# Patient Record
Sex: FEMALE | Race: ASIAN | NOT HISPANIC OR LATINO
[De-identification: names, ages, dates, MRNs, and addresses within clinical notes are randomized per-mention and may not be internally consistent; named-entity substitution may affect disease eponyms.]

---

## 2020-06-16 DIAGNOSIS — Z01.818 ENCOUNTER FOR OTHER PREPROCEDURAL EXAMINATION: ICD-10-CM

## 2020-06-17 ENCOUNTER — APPOINTMENT (OUTPATIENT)
Dept: DISASTER EMERGENCY | Facility: CLINIC | Age: 60
End: 2020-06-17

## 2020-06-18 LAB — SARS-COV-2 N GENE NPH QL NAA+PROBE: NOT DETECTED

## 2022-04-14 ENCOUNTER — APPOINTMENT (OUTPATIENT)
Dept: PAIN MANAGEMENT | Facility: CLINIC | Age: 62
End: 2022-04-14
Payer: COMMERCIAL

## 2022-04-14 VITALS — HEIGHT: 64 IN | WEIGHT: 180 LBS | BODY MASS INDEX: 30.73 KG/M2

## 2022-04-14 PROCEDURE — 99214 OFFICE O/P EST MOD 30 MIN: CPT

## 2022-04-14 NOTE — DATA REVIEWED
[MRI] : MRI [Lumbar Spine] : lumbar spine [Report was reviewed and noted in the chart] : The report was reviewed and noted in the chart [I reviewed the films/CD] : I reviewed the films/CD

## 2022-04-15 NOTE — PHYSICAL EXAM
[Normal Coordination] : normal coordination [Normal Mood and Affect] : normal mood and affect [Orientated] : orientated [Able to Communicate] : able to communicate [Well Developed] : well developed [Well Nourished] : well nourished [Left lower extremity below knee] : left lower extremity below knee [] : non-antalgic [FreeTextEntry3] : well healed midline and left iliac surgical scar

## 2022-04-15 NOTE — ASSESSMENT
[FreeTextEntry1] : A thorough discussion occurred regarding available pain management treatment options including interventional, rehabilitative, pharmacological, and alternative modalities with the patient. We will proceed with the following:\par \par Interventional treatment options:\par - Proceed with left L4-5, L5-S1 TFESI with fluoroscopic guidance; may remain on aspirin\par - Previously discussed concern of corticosteroid administration in setting for osteoporosis; patient is monitored yearly by her endocrinologist\par - see additional instructions below\par \par Rehabilitative options:\par - Has completed recent PT trial with relief; encouraged on going HEP\par - Home exercise sheet provided at prior visit\par \par Medication based treatment options:\par - Continue Tylenol 500-1000mg TID PRN\par - continue gabapentin 2400mg daily as per treating neurologist \par - previous chronic opioid use several years ago; was able to self discontinue \par - see additional instructions below\par \par Complementary treatment options:\par - Weight management and lifestyle modifications discussed\par \par Additional treatment recommendations as follows:\par - Follow up 1-2 weeks post injection for assessment of efficacy and further recommendations.\par \par The risks, benefits and alternatives of the proposed procedure were explained in detail with the patient. The risks outlined include but are not limited to infection, bleeding, post- dural puncture headache, nerve injury, a temporary increase in pain, failure to resolve symptoms, allergic reaction, and possible elevation of blood sugar in diabetics if using corticosteroid.  All questions were answered to patient's apparent satisfaction and he/she verbalized an understanding.\par \par The documentation recorded by the scribe, in my presence, accurately reflects the service I personally performed and the decisions made by me with my edits as appropriate.\par \par I, Bessy Young acting as scribe, attest that this documentation has been prepared under the direction and in the presence of Provider Wilman Lopez DO. JAN

## 2022-04-15 NOTE — REVIEW OF SYSTEMS
[Headache] : headache [Depression] : depression [Negative] : Heme/Lymph [Fever] : no fever [Chills] : no chills [Feeling Tired] : no fatigue

## 2022-04-15 NOTE — HISTORY OF PRESENT ILLNESS
[Lower back] : lower back [Buttock] : buttock [Left Leg] : left leg [9] : 9 [4] : 4 [Burning] : burning [Constant] : constant [Sleep] : sleep [Rest] : rest [Injection therapy] : injection therapy [Sitting] : sitting [Physical therapy] : physical therapy [Retired] : Work status: retired [2] : 2 [] : no [FreeTextEntry1] : l [FreeTextEntry7] : left leg  [de-identified] : 2003 [de-identified] : 2021 [de-identified] : mri l spine  [TWNoteComboBox1] : 100%

## 2022-05-02 LAB — SARS-COV-2 N GENE NPH QL NAA+PROBE: NOT DETECTED

## 2022-05-06 ENCOUNTER — APPOINTMENT (OUTPATIENT)
Dept: PAIN MANAGEMENT | Facility: CLINIC | Age: 62
End: 2022-05-06
Payer: COMMERCIAL

## 2022-05-06 PROCEDURE — 64484 NJX AA&/STRD TFRM EPI L/S EA: CPT | Mod: LT

## 2022-05-06 PROCEDURE — 64483 NJX AA&/STRD TFRM EPI L/S 1: CPT | Mod: LT

## 2022-05-06 NOTE — PROCEDURE
[FreeTextEntry3] : Date of Service: 05/06/2022 \par \par Account: [id number]\par \par Patient: PIPER BREWER \par \par YOB: 1960\par \par Age: 62 year\par \par Surgeon:   Wilman Lopez DO\par \par Assistant:  None\par \par Pre-Operative Diagnosis:   Lumbosacral Radiculitis (M54.17)\par \par Post Operative Diagnosis: Lumbosacral Radiculitis (M54.17)\par \par Procedure:             Left L4-L5, L5-S1 transforaminal epidural steroid injection under fluoroscopic guidance.\par \par Anesthesia:            MAC\par \par This procedure was carried out using fluoroscopic guidance.  The risks and benefits of the procedure were discussed extensively with the patient.  The consent of the patient was obtained and the following procedure was performed. The patient was placed in the prone position on the fluoroscopy table and the area was prepped and draped in a sterile fashion.  A timeout was performed with all essential staff present and the site and side were verified.\par \par The left L4-L5 neural foramen was then identified on right oblique "juli dog" anatomical view at the 6 o' clock position using fluoroscopic guidance, and the area was marked. The overlying skin and subcutaneous structures were anesthetized using sterile technique with 1% Lidocaine.   A 22 gauge 3.5 inch spinal needle was directed toward the inferior (6 o'clock) position of the pedicle, which formed the roof of the identified foramen.  Once in the epidural space, after negative aspiration for heme and CSF, 1cc of Omnipaque contrast was injected to confirm epidural location and assess filling defects and rule out intravascular needle placement.\par \par Lumbar epidurogram showed no intravascular or intrathecal flow pattern.  No blood or CSF was aspirated.  Omnipaque spread medially in epidural space and outlined the exiting nerve root.\par \par After this, 2.5 cc of a mixture of 4cc of preservative free normal saline plus 80 mg of Kenalog was injected in the epidural space\par \par The left L5-S1 neural foramen and was then identified on right oblique "juli dog" anatomical view at the 6 o' clock position using fluoroscopic guidance, and the area was marked.  The overlying skin and subcutaneous structures were anesthetized using sterile technique with 1% Lidocaine.   A 22 gauge 3.5 inch spinal needle was directed toward the inferior (6 o'clock) position of the pedicle, which formed the roof of the identified foramen.  Once in the epidural space, after negative aspiration for heme and CSF, 1cc of Omnipaque contrast was injected to confirm epidural location and assess filling defects and rule out intravascular needle placement. \par \par Lumbar epidurogram showed no intravascular or intrathecal flow pattern.  No blood or CSF was aspirated.  Omnipaque spread medially in epidural space and outlined the exiting nerve root. \par \par After this, the remainder of the injectate listed above was injected in the epidural space.\par \par The needle was subsequently removed.  Vital signs remained normal.  Pulse oximeter was used throughout the procedure and the patient's pulse and oxygen saturation remained within normal limits.  The patient tolerated the procedure well.  There were no complications.  The patient was instructed to apply ice over the injection sites for twenty minutes every two hours for the next 24 to 48 hours.\par \par Disposition:\par      1. The patient was advised to F/U in 1-2 weeks to assess the response to the injection.\par      2. The patient was also instructed to contact me immediately if there were any concerns related to the procedure performed.

## 2022-06-23 ENCOUNTER — APPOINTMENT (OUTPATIENT)
Dept: PAIN MANAGEMENT | Facility: CLINIC | Age: 62
End: 2022-06-23
Payer: COMMERCIAL

## 2022-06-23 VITALS — HEIGHT: 64 IN | BODY MASS INDEX: 30.73 KG/M2 | WEIGHT: 180 LBS

## 2022-06-23 PROCEDURE — 99214 OFFICE O/P EST MOD 30 MIN: CPT

## 2022-06-25 NOTE — ASSESSMENT
[FreeTextEntry1] : A thorough discussion occurred regarding available pain management treatment options including interventional, rehabilitative, pharmacological, and alternative modalities with the patient. We will proceed with the following:\par \par Interventional treatment options:\par - can repeat caudal RASHEEDA with severe radicular pain; may remain on aspirin \par - patient continues to maintain that caudal RASHEEDA provide her the greatest degree of relief \par - once again I discussed my concern for repeat corticosteroid administration in setting for osteoporosis; patient is monitored yearly by her endocrinologist\par - furthermore I counseled her I do not advise corticosteroid based injections more than 3/x per year at most\par - see additional instructions below\par \par Rehabilitative options:\par - Has completed recent PT trial with relief; encouraged on going HEP\par - Home exercise sheet provided at prior visit\par \par Medication based treatment options:\par - Continue Tylenol 500-1000mg TID PRN\par - continue gabapentin 2400mg daily as per treating neurologist \par - previous chronic opioid use several years ago; was able to self discontinue \par - see additional instructions below\par \par Complementary treatment options:\par - Weight management and lifestyle modifications discussed\par \par Additional treatment recommendations as follows:\par - Follow up for indicated procedure or PRN\par \par The documentation recorded by the scribe, in my presence, accurately reflects the service I personally performed and the decisions made by me with my edits as appropriate.\par \par I, Bessy Young acting as scribe, attest that this documentation has been prepared under the direction and in the presence of Provider Wilman Lopez DO.

## 2022-06-25 NOTE — HISTORY OF PRESENT ILLNESS
[Lower back] : lower back [Buttock] : buttock [Left Leg] : left leg [Burning] : burning [Constant] : constant [Sleep] : sleep [Rest] : rest [Injection therapy] : injection therapy [Sitting] : sitting [Physical therapy] : physical therapy [Retired] : Work status: retired [2] : 2 [6] : 6 [3] : 3 [FreeTextEntry1] : 06/23/22 - Patient presents for a FUV following an left L4-5, L5-S1 TFESI on 05/06/22. She reports resolution of leg pain. She reports burning sensation to the low back with residual paraesthesia to the left toes. \par \par 04/14/22 - Patient presents for a FUV. Patient reports return of low back pain. Patient c/o low back pain radiating to the left lower extremity. Patient reports pain with walking.  Patient reports relief with previous RASHEEDA. \par \par 01/27/2022 - Patient presents for a follow up evaluation and review of her lumbar spine MRI results. Patient reports that her most recent RASHEEDA 1/7/22 was helpful in alleviating her pain. Patient reports that after the steroids wear off, she experiences mild lower back pain with moderate to severe radicular left posterior thigh. Patient reports a >90% reduction in her symptoms following her last RASHEEDA. \par \par 12/02/21 - Patient presents for a follow up evaluation and review of her lumbar MRI Results. Patient c/o radicular leg pains. Patient reports that she has been experiencing increasing radicular left leg pain. Patient has weakness in her left leg. Patient c/o worsening paresthesia's in her left leg. Patient reports that walking exacerbates her pain. \par \par 09/23/21 - Patient presents for a FUV following a Caudal RASHEEDA on 09/03/21. Patient reports a 100% resolution in her leg symptoms. MRI imaging of lumbar spine was not approved prior. She reports that the duration of relief she has obtained over the last few years wit RASHEEDA's has diminished. \par \par 8/26/21 - Patient presents for a follow up evaluation and discussion of her lumbar MRI. The patient could not get approval for her MRI. The patient's main c/o is in her left leg only. \par \par 07/29/2021 - Patient presents for initial evaluation. She reports that she was seeing  for pain management following a compression fracture she developed on July, 30th 2003. The patient had a spinal fusion following her compression fracture and was found to have osteoporosis. The patient reports they did a fusion of T12-l2. The patient reports her pain is in her lower back. The patient reports her lower back radiates into her left buttock and left leg when walking. She has obtain caudal RASHEEDA's in the past for her pain. The patient reports she has been receiving RASHEEDA's every 3 months since 2005. The patient reports that she doesn't take any pain medications. The patient used to be on chronic opioids but has since discontinued use of them. Sees neurologist for chronic back pains. The patient takes gabapentin 1200mg x2 daily. \par \par Previous Injections:\par 1) Caudal RASHEEDA's - Dr. Doran (several DOS)\par 2) Caudal RASHEEDA (09/03/21, 1/7/22)\par 3) Left L4-5, L5-S1 TFESI (05/06/22) \par \par Pertinent Surgical History: Yes\par 1) T12-L2 posterior spinal fusion; L1 Kyphoplasty\par \par Imaging:\par Lumbar Spine MRI (01/13/2022) - [facility]\par \par 1. Evidence For Prior T12-L2 Fusion\par 2. Evidence for a chronic moderate anterior wedging compression fracture of L1 with prior kyphoplasty augmentation\par 3. Minimal disc bulging at L4-5 with mild bilateral foraminal stenosis and mild bilateral facet arthropathy \par \par Physician Disclaimer: I have personally reviewed and confirmed all HPI data with the patient. [] : no [FreeTextEntry7] : left leg  [de-identified] : 2003 [de-identified] : 2021 [de-identified] : mri l spine  [TWNoteComboBox1] : 100%

## 2022-06-25 NOTE — PHYSICAL EXAM
[Normal Coordination] : normal coordination [Normal Mood and Affect] : normal mood and affect [Orientated] : orientated [Able to Communicate] : able to communicate [Well Developed] : well developed [Well Nourished] : well nourished [Left lower extremity below knee] : left lower extremity below knee [Left] : left hip [] : non-antalgic [FreeTextEntry3] : well healed midline and left iliac surgical scar

## 2022-06-25 NOTE — REASON FOR VISIT
[Follow-Up Visit] : a follow-up pain management visit [FreeTextEntry2] : lower back and follow up to injection

## 2022-09-02 ENCOUNTER — APPOINTMENT (OUTPATIENT)
Dept: PAIN MANAGEMENT | Facility: CLINIC | Age: 62
End: 2022-09-02

## 2022-09-02 PROCEDURE — 62323 NJX INTERLAMINAR LMBR/SAC: CPT

## 2022-09-02 NOTE — PROCEDURE
[FreeTextEntry3] : Date of Service: 09/02/2022 \par \par Account: [id number]\par \par Patient: PIPER BREWER \par \par YOB: 1960\par \par Age: 62 year\par \par Surgeon:      Wilman Lopez DO\par \par Assistant:    None\par \par Pre-Operative Diagnosis:         Lumbar radiculopathy\par \par Post Operative Diagnosis:       Lumbar radiculopathy\par \par Procedure:             Caudal epidural steroid injection with fluoroscopic guidance\par \par Anesthesia:            MAC\par \par This procedure was carried out using fluoroscopic guidance.  The risks and benefits of the procedure were discussed extensively with the patient.  The consent of the patient was obtained and the following procedure was performed. The patient was placed in the prone position on the fluoroscopy table and the area was prepped and draped in a sterile fashion.  A timeout was performed with all essential staff present and the site and side were verified.\par \par The sacrum was visualized using a lateral  fluoroscopic view.   Using sterile technique the superficial skin was anesthetized with 1% Lidocaine.  A 22 gauge 3.5 spinal needle was advanced under fluoroscopy until the sacrococcygeal ligament was engaged.  After confirmation of needle placement in the epidural space, the needle was advanced to approximately the S3 level.  After negative aspiration for heme and CSF, 3 cc of Omnipaque confirmed an appropriate lumbar epidurogram.  An A/P view was used to confirm dye spread.  An injectate of 8cc of preservative free normal saline with 80 mg of Kenalog was then injected into the epidural space.\par \par The needle was subsequently removed.  Vital signs remained normal.  Pulse oximeter was used throughout the procedure and the patient's pulse and oxygen saturation remained within normal limits.  The patient tolerated the procedure well.  There were no complications.  The patient was instructed to apply ice over the injection sites for twenty minutes every two hours for the next 24 to 48 hours.\par \par Disposition:\par      1.  The patient was advised to F/U in 1-2 weeks to assess the response to the injection.\par      2.  The patient was also instructed to contact me immediately if there were any concerns related to the procedure performed.

## 2022-09-29 ENCOUNTER — APPOINTMENT (OUTPATIENT)
Dept: PAIN MANAGEMENT | Facility: CLINIC | Age: 62
End: 2022-09-29

## 2022-09-29 VITALS — WEIGHT: 180 LBS | HEIGHT: 64 IN | BODY MASS INDEX: 30.73 KG/M2

## 2022-09-29 PROCEDURE — 99214 OFFICE O/P EST MOD 30 MIN: CPT

## 2022-09-30 NOTE — HISTORY OF PRESENT ILLNESS
[Lower back] : lower back [Buttock] : buttock [Left Leg] : left leg [6] : 6 [3] : 3 [Burning] : burning [Constant] : constant [Sleep] : sleep [Rest] : rest [Injection therapy] : injection therapy [Sitting] : sitting [Physical therapy] : physical therapy [Retired] : Work status: retired [2] : 2 [FreeTextEntry1] : 09/29/22 - Patient presents for a FUV following a caudal RASHEEDA on 09/02/21.  Reports that her pain has been significantly reduced, reports a reduction of pain by 80%. Reports that she has increased her level of activity, and has decreased her medication usage.  \par \par 06/23/22 - Patient presents for a FUV following an left L4-5, L5-S1 TFESI on 05/06/22. She reports resolution of leg pain. She reports burning sensation to the low back with residual paraesthesia to the left toes. \par \par 04/14/22 - Patient presents for a FUV. Patient reports return of low back pain. Patient c/o low back pain radiating to the left lower extremity. Patient reports pain with walking. Patient reports relief with previous RASHEEDA. \par \par 01/27/2022 - Patient presents for a follow up evaluation and review of her lumbar spine MRI results. Patient reports that her most recent RASHEEDA 1/7/22 was helpful in alleviating her pain. Patient reports that after the steroids wear off, she experiences mild lower back pain with moderate to severe radicular left posterior thigh. Patient reports a >90% reduction in her symptoms following her last RASHEEDA. \par \par 12/02/21 - Patient presents for a follow up evaluation and review of her lumbar MRI Results. Patient c/o radicular leg pains. Patient reports that she has been experiencing increasing radicular left leg pain. Patient has weakness in her left leg. Patient c/o worsening paresthesia's in her left leg. Patient reports that walking exacerbates her pain. \par \par 09/23/21 - Patient presents for a FUV following a Caudal RASHEEDA on 09/03/21. Patient reports a 100% resolution in her leg symptoms. MRI imaging of lumbar spine was not approved prior. She reports that the duration of relief she has obtained over the last few years wit RASHEEDA's has diminished. \par \par 8/26/21 - Patient presents for a follow up evaluation and discussion of her lumbar MRI. The patient could not get approval for her MRI. The patient's main c/o is in her left leg only. \par \par 07/29/2021 - Patient presents for initial evaluation. She reports that she was seeing  for pain management following a compression fracture she developed on July, 30th 2003. The patient had a spinal fusion following her compression fracture and was found to have osteoporosis. The patient reports they did a fusion of T12-l2. The patient reports her pain is in her lower back. The patient reports her lower back radiates into her left buttock and left leg when walking. She has obtain caudal RASHEEDA's in the past for her pain. The patient reports she has been receiving RASHEEDA's every 3 months since 2005. The patient reports that she doesn't take any pain medications. The patient used to be on chronic opioids but has since discontinued use of them. Sees neurologist for chronic back pains. The patient takes gabapentin 1200mg x2 daily. \par \par Previous Injections:\par 1) Caudal RASHEEDA's - Dr. Doran (several DOS)\par 2) Caudal RASHEEDA (09/03/21, 1/7/22, 09/02/22)\par 3) Left L4-5, L5-S1 TFESI (05/06/22) \par \par Pertinent Surgical History: Yes\par 1) T12-L2 posterior spinal fusion; L1 Kyphoplasty\par \par Imaging:\par Lumbar Spine MRI (01/13/2022) - [facility]\par \par 1. Evidence For Prior T12-L2 Fusion\par 2. Evidence for a chronic moderate anterior wedging compression fracture of L1 with prior kyphoplasty augmentation\par 3. Minimal disc bulging at L4-5 with mild bilateral foraminal stenosis and mild bilateral facet arthropathy \par \par Physician Disclaimer: I have personally reviewed and confirmed all HPI data with the patient.  [] : no [FreeTextEntry7] : left leg  [de-identified] : 2003 [de-identified] : 2021 [de-identified] : mri l spine  [TWNoteComboBox1] : 100%

## 2022-09-30 NOTE — ASSESSMENT
[FreeTextEntry1] : A thorough discussion occurred regarding available pain management treatment options including interventional, rehabilitative, pharmacological, and alternative modalities with the patient. We will proceed with the following:\par \par Interventional treatment options:\par - can repeat caudal RASHEEDA with return of severe radicular pain; may remain on aspirin - will call\par - previously discussed my concern for repeat corticosteroid administration in setting for osteoporosis; patient is monitored yearly by her endocrinologist\par - furthermore I counseled her I do not advise corticosteroid based injections more than Q4-6 month intervals per year at most\par - see additional instructions below\par \par Rehabilitative options:\par - Has completed recent PT trial with relief; encouraged on going HEP\par - Home exercise sheet provided at prior visit\par \par Medication based treatment options:\par - Continue Tylenol 500-1000 mg TID PRN\par - continue gabapentin 2400 mg daily as per treating neurologist \par - previous chronic opioid use several years ago; was able to self discontinue \par - see additional instructions below\par \par Complementary treatment options:\par - Weight management and lifestyle modifications discussed\par \par Additional treatment recommendations as follows:\par - Follow up for indicated procedure or PRN\par \par The risks, benefits and alternatives of the proposed procedure were explained in detail with the patient.  The risks outlined include, but are not limited to, infection, bleeding, nerve injury, a temporary increase in pain, failure to resolve symptoms, allergic reaction, and possible elevation of blood sugar in diabetics.  All questions were answered to patient's apparent satisfaction and he/she verbalized an understanding. \par \par The documentation recorded by the scribe, in my presence, accurately reflects the service I personally performed and the decisions made by me with my edits as appropriate.\par \par I, Brad Billings acting as scribe, attest that this documentation has been prepared under the direction and in the presence of Provider Wilman Lopez DO.

## 2022-09-30 NOTE — PHYSICAL EXAM
[Biceps 2+] : biceps 2+ [Triceps 2+] : triceps 2+ [Brachioradialis 2+] : brachioradialis 2+ [de-identified] : Constitutional:  \par - No acute distress  \par - Well developed; well nourished  \par \par Neurological:  \par - normal mood and affect  \par - alert and oriented x 3   \par \par Cardiovascular:  \par - grossly normal  [] : no sciatic nerve tenderness

## 2023-01-06 ENCOUNTER — APPOINTMENT (OUTPATIENT)
Dept: PAIN MANAGEMENT | Facility: CLINIC | Age: 63
End: 2023-01-06
Payer: COMMERCIAL

## 2023-01-06 PROCEDURE — 62323 NJX INTERLAMINAR LMBR/SAC: CPT

## 2023-01-06 NOTE — PROCEDURE
[FreeTextEntry3] : Date of Service: 01/06/2023 \par \par Account: [id number]\par \par Patient: PIPER BREWER \par \par YOB: 1960\par \par Age: 62 year\par \par Surgeon:      Wilman Lopez DO\par \par Assistant:    None\par \par Pre-Operative Diagnosis:         Lumbar radiculopathy\par \par Post Operative Diagnosis:       Lumbar radiculopathy\par \par Procedure:             Caudal epidural steroid injection with fluoroscopic guidance\par \par Anesthesia:            MAC\par \par This procedure was carried out using fluoroscopic guidance.  The risks and benefits of the procedure were discussed extensively with the patient.  The consent of the patient was obtained and the following procedure was performed. The patient was placed in the prone position on the fluoroscopy table and the area was prepped and draped in a sterile fashion.  A timeout was performed with all essential staff present and the site and side were verified.\par \par The sacrum was visualized using a lateral  fluoroscopic view.   Using sterile technique the superficial skin was anesthetized with 1% Lidocaine.  A 22 gauge 3.5 spinal needle was advanced under fluoroscopy until the sacrococcygeal ligament was engaged.  After confirmation of needle placement in the epidural space, the needle was advanced to approximately the S3 level.  After negative aspiration for heme and CSF, 3 cc of Omnipaque confirmed an appropriate lumbar epidurogram.  An A/P view was used to confirm dye spread.  An injectate of 8cc of preservative free normal saline with 80 mg of Kenalog was then injected into the epidural space.\par \par The needle was subsequently removed.  The patient tolerated the procedure well.  There were no complications.  The patient was instructed to apply ice over the injection sites for twenty minutes every two hours for the next 24 to 48 hours.\par \par Disposition:\par      1.  The patient was advised to F/U in 1-2 weeks to assess the response to the injection.\par      2.  The patient was also instructed to contact me immediately if there were any concerns related to the procedure performed.

## 2023-01-26 ENCOUNTER — APPOINTMENT (OUTPATIENT)
Dept: PAIN MANAGEMENT | Facility: CLINIC | Age: 63
End: 2023-01-26
Payer: COMMERCIAL

## 2023-01-26 VITALS — WEIGHT: 180 LBS | BODY MASS INDEX: 30.73 KG/M2 | HEIGHT: 64 IN

## 2023-01-26 PROCEDURE — 99214 OFFICE O/P EST MOD 30 MIN: CPT

## 2023-01-27 NOTE — PHYSICAL EXAM
[de-identified] : Constitutional:  \par - No acute distress  \par - Well developed; well nourished  \par \par Neurological:  \par - normal mood and affect  \par - alert and oriented x 3   \par \par Cardiovascular:  \par - grossly normal \par \par Lumbar Spine Exam: \par \par Inspection: \par erythema (-) \par ecchymosis (-) \par rashes (-) \par alignment: no scoliosis \par \par Palpation: \par Midline lumbar tenderness:            (-) \par midline thoracic tenderness:          (-) \par Lumbar paraspinal tenderness:  L (-) ; R (-) \par thoracic paraspinal tenderness: L (-) ; R (-) \par sciatic nerve tenderness :          L (-) ; R (-) \par SI joint tenderness:                     L (-) ; R (-) \par GTB tenderness:                        L (-);  R (-) \par \par ROM: \par \par Strength: \par                                    Right       Left    \par Hip Flexion:                (5/5)       (5/5) \par Quadriceps:               (5/5)       (5/5) \par Hamstrings:                (5/5)       (5/5) \par Ankle Dorsiflexion:     (5/5)       (5/5) \par EHL:                           (5/5)       (5/5) \par Ankle Plantarflexion:  (5/5)       (5/5) \par \par Special Tests: \par SLR:                            R (-) ; L (-) \par Facet loading:             R (-) ; L (-) \par GISSEL test:                R (-) ; L (-) \par Hamstring tightness:   R (-);  L (-) \par \par Neurologic: \par SILT throughout right lower extremity \par SILT throughout left lower extremity \par \par Reflexes normal and symmetric bilateral lower extremities \par \par Gait: \par non- antalgic gait \par ambulates without assistive device \par \par Cervical Spine Exam: \par \par Inspection:  \par erythema (-)  \par ecchymosis (-)  \par rashes (-)  \par \par Palpation:                                                   \par Cervical paraspinal tenderness:         R (-); L(-) \par Upper trapezius tenderness:              R (-); L (-) \par Rhomboids tenderness:                      R (-); L (-) \par Occipital Ridge:                                    R (-); L (-) \par Supraspinatus tenderness:                 R (-); L (-) \par \par ROM:   \par \par Strength Testing:             \par Deltoid                           R (5/5); L (5/5) \par Biceps:                          R (5/5); L (5/5) \par Triceps:                         R (5/5); L (5/5) \par Finger Abductors:         R (5/5); L (5/5) \par Grasp:                           R (5/5); L (5/5) \par \par Special Testing: \par Spurling Test:                  R (-); L (-) \par Facet load test:               R (-); L (-) \par \par Neuro: \par SILT throughout right upper extremity \par SILT throughout left upper extremity \par \par Reflexes: \par Biceps   -           R (2+); L (2+) \par Triceps  -           R (2+); L (2+) \par Brachioradialis- R (2+); L (2+)   \par \par No ankle clonus

## 2023-01-27 NOTE — HISTORY OF PRESENT ILLNESS
[Lower back] : lower back [Buttock] : buttock [Left Leg] : left leg [6] : 6 [3] : 3 [Burning] : burning [Constant] : constant [Sleep] : sleep [Rest] : rest [Injection therapy] : injection therapy [Sitting] : sitting [Physical therapy] : physical therapy [Retired] : Work status: retired [2] : 2 [FreeTextEntry1] : 1/26/2023 - Patient presents for FUV after a caudal RASHEEDA on 1/6/2023. Patient reports greater than 80% relief from the lower back pain following the procedure. Patient is happy with the results of the injection. She takes 2400mg gabapentin daily; sees neurology.\par \par 09/29/22 - Patient presents for a FUV following a caudal RASHEEDA on 09/02/21.  Reports that her pain has been significantly reduced, reports a reduction of pain by 80%. Reports that she has increased her level of activity, and has decreased her medication usage.  \par \par 06/23/22 - Patient presents for a FUV following an left L4-5, L5-S1 TFESI on 05/06/22. She reports resolution of leg pain. She reports burning sensation to the low back with residual paraesthesia to the left toes. \par \par 04/14/22 - Patient presents for a FUV. Patient reports return of low back pain. Patient c/o low back pain radiating to the left lower extremity. Patient reports pain with walking. Patient reports relief with previous RASHEEDA. \par \par 01/27/2022 - Patient presents for a follow up evaluation and review of her lumbar spine MRI results. Patient reports that her most recent RASHEEDA 1/7/22 was helpful in alleviating her pain. Patient reports that after the steroids wear off, she experiences mild lower back pain with moderate to severe radicular left posterior thigh. Patient reports a >90% reduction in her symptoms following her last RASHEEDA. \par \par 12/02/21 - Patient presents for a follow up evaluation and review of her lumbar MRI Results. Patient c/o radicular leg pains. Patient reports that she has been experiencing increasing radicular left leg pain. Patient has weakness in her left leg. Patient c/o worsening paresthesia's in her left leg. Patient reports that walking exacerbates her pain. \par \par 09/23/21 - Patient presents for a FUV following a Caudal RASHEEDA on 09/03/21. Patient reports a 100% resolution in her leg symptoms. MRI imaging of lumbar spine was not approved prior. She reports that the duration of relief she has obtained over the last few years wit RASHEEDA's has diminished. \par \par 8/26/21 - Patient presents for a follow up evaluation and discussion of her lumbar MRI. The patient could not get approval for her MRI. The patient's main c/o is in her left leg only. \par \par 07/29/2021 - Patient presents for initial evaluation. She reports that she was seeing  for pain management following a compression fracture she developed on July, 30th 2003. The patient had a spinal fusion following her compression fracture and was found to have osteoporosis. The patient reports they did a fusion of T12-l2. The patient reports her pain is in her lower back. The patient reports her lower back radiates into her left buttock and left leg when walking. She has obtain caudal RASHEEDA's in the past for her pain. The patient reports she has been receiving RASHEEDA's every 3 months since 2005. The patient reports that she doesn't take any pain medications. The patient used to be on chronic opioids but has since discontinued use of them. Sees neurologist for chronic back pains. The patient takes gabapentin 1200mg x2 daily. \par \par Previous Injections:\par 1) Caudal RASHEEDA's - Dr. Doran (several DOS)\par 2) Caudal RASHEEDA (09/03/21, 1/7/22, 09/02/22, 1/6/2023)\par 3) Left L4-5, L5-S1 TFESI (05/06/22) \par \par Pertinent Surgical History: Yes\par 1) T12-L2 posterior spinal fusion; L1 Kyphoplasty\par \par Imaging:\par Lumbar Spine MRI (01/13/2022) - [facility]\par \par 1. Evidence For Prior T12-L2 Fusion\par 2. Evidence for a chronic moderate anterior wedging compression fracture of L1 with prior kyphoplasty augmentation\par 3. Minimal disc bulging at L4-5 with mild bilateral foraminal stenosis and mild bilateral facet arthropathy \par \par Physician Disclaimer: I have personally reviewed and confirmed all HPI data with the patient.  [] : no [FreeTextEntry7] : left leg  [de-identified] : 2003 [de-identified] : 2021 [de-identified] : mri l spine  [TWNoteComboBox1] : 100%

## 2023-01-27 NOTE — ASSESSMENT
[FreeTextEntry1] : A thorough discussion occurred regarding available pain management treatment options including interventional, rehabilitative, pharmacological, and alternative modalities with the patient. We will proceed with the following:\par \par Interventional treatment options:\par - can repeat caudal RASHEEDA with return of severe radicular pain; may remain on aspirin - will call\par - previously discussed my concern for repeat corticosteroid administration in setting for osteoporosis; patient is monitored yearly by her endocrinologist\par - furthermore I counseled her I do not advise corticosteroid based injections more than Q4-6 month intervals per year at most\par - see additional instructions below\par \par Rehabilitative options:\par - Has completed recent PT trial with relief; encouraged on going HEP\par - Home exercise sheet provided at prior visit\par \par Medication based treatment options:\par - Continue Tylenol 500-1000 mg TID PRN\par - continue gabapentin 2400 mg daily as per treating neurologist \par - previous chronic opioid use several years ago; was able to self discontinue \par - see additional instructions below\par \par Complementary treatment options:\par - Weight management and lifestyle modifications discussed\par \par Additional treatment recommendations as follows:\par - Follow up for indicated procedure or PRN\par \par The risks, benefits and alternatives of the proposed procedure were explained in detail with the patient.  The risks outlined include, but are not limited to, infection, bleeding, nerve injury, a temporary increase in pain, failure to resolve symptoms, allergic reaction, and possible elevation of blood sugar in diabetics.  All questions were answered to patient's apparent satisfaction and he/she verbalized an understanding. \par \par The documentation recorded by the scribe, in my presence, accurately reflects the service I personally performed and the decisions made by me with my edits as appropriate.\par \par I, Josué Torres acting as scribe, attest that this documentation has been prepared under the direction and in the presence of Provider Wilman Lopez DO.

## 2023-05-12 ENCOUNTER — APPOINTMENT (OUTPATIENT)
Dept: PAIN MANAGEMENT | Facility: CLINIC | Age: 63
End: 2023-05-12
Payer: COMMERCIAL

## 2023-05-12 PROCEDURE — 62323 NJX INTERLAMINAR LMBR/SAC: CPT

## 2023-05-12 NOTE — PROCEDURE
[FreeTextEntry3] : Date of Service: 05/12/2023 \par \par Account: [id number]\par \par Patient: PIPER BREWER \par \par YOB: 1960\par \par Age: 63 year\par \par Surgeon:      Wilman Lopez DO\par \par Assistant:    None\par \par Pre-Operative Diagnosis:         Lumbar radiculopathy\par \par Post Operative Diagnosis:       Lumbar radiculopathy\par \par Procedure:             Caudal epidural steroid injection with fluoroscopic guidance\par \par Anesthesia:            MAC\par \par This procedure was carried out using fluoroscopic guidance.  The risks and benefits of the procedure were discussed extensively with the patient.  The consent of the patient was obtained and the following procedure was performed.  A timeout was performed with all essential staff present and the site and side were verified.\par \par The sacrum was visualized using a lateral  fluoroscopic view.   Using sterile technique the superficial skin was anesthetized with 1% Lidocaine.  A 22 gauge 3.5 spinal needle was advanced under fluoroscopy until the sacrococcygeal ligament was engaged.  After confirmation of needle placement in the epidural space, the needle was advanced to approximately the S3 level.  After negative aspiration for heme and CSF, 3 cc of Omnipaque confirmed an appropriate lumbar epidurogram.  An A/P view was used to confirm dye spread.  An injectate of 8cc of preservative free normal saline with 80 mg of Kenalog was then injected into the epidural space.\par \par Vital signs remained normal throughout the procedure.  The patient tolerated the procedure well.  There were no immediate complications from the performed procedure.  The patient was instructed to apply ice over the injection sites for twenty minutes every two hours for the next 24 hours.\par \par Disposition:\par      1.  The patient was advised to F/U in 1-2 weeks to assess the response to the injection.\par      2.  The patient was also instructed to contact me immediately if there were any concerns related to the procedure performed.

## 2023-06-08 ENCOUNTER — APPOINTMENT (OUTPATIENT)
Dept: PAIN MANAGEMENT | Facility: CLINIC | Age: 63
End: 2023-06-08
Payer: COMMERCIAL

## 2023-06-08 VITALS — BODY MASS INDEX: 30.73 KG/M2 | HEIGHT: 64 IN | WEIGHT: 180 LBS

## 2023-06-08 PROCEDURE — 99214 OFFICE O/P EST MOD 30 MIN: CPT

## 2023-06-08 NOTE — ASSESSMENT
[FreeTextEntry1] : A thorough discussion occurred regarding available pain management treatment options including interventional, rehabilitative, pharmacological, and alternative modalities with the patient. We will proceed with the following:\par \par Interventional treatment options:\par - can repeat caudal RASHEEDA with return of severe radicular pain; may remain on aspirin - will call\par - previously discussed my concern for repeat corticosteroid administration in setting for osteoporosis; patient is monitored yearly by her endocrinologist\par - furthermore I counseled her I do not advise corticosteroid based injections more than Q4-6 month intervals per year at most\par - see additional instructions below\par \par Rehabilitative options:\par - Has completed recent PT trial with relief; encouraged on going HEP\par - Home exercise sheet provided at prior visit\par \par Medication based treatment options:\par - Continue Tylenol 500-1000 mg TID PRN\par - continue gabapentin 2400 mg daily as per treating neurologist \par - previous chronic opioid use several years ago; was able to self discontinue \par - see additional instructions below\par \par Complementary treatment options:\par - Weight management and lifestyle modifications discussed\par \par Additional treatment recommendations as follows:\par - Follow up for indicated procedure or PRN\par \par The risks, benefits and alternatives of the proposed procedure were explained in detail with the patient.  The risks outlined include, but are not limited to, infection, bleeding, nerve injury, a temporary increase in pain, failure to resolve symptoms, allergic reaction, and possible elevation of blood sugar in diabetics.  All questions were answered to patient's apparent satisfaction and he/she verbalized an understanding. \par \par The documentation recorded by the scribe, in my presence, accurately reflects the service I personally performed and the decisions made by me with my edits as appropriate.\par \par I, Ciro ERWIN, personally performed the services described in this documentation incident to Wilman Lopez DO.

## 2023-06-08 NOTE — HISTORY OF PRESENT ILLNESS
[Lower back] : lower back [Buttock] : buttock [Left Leg] : left leg [6] : 6 [3] : 3 [Burning] : burning [Constant] : constant [Sleep] : sleep [Rest] : rest [Injection therapy] : injection therapy [Sitting] : sitting [Physical therapy] : physical therapy [Retired] : Work status: retired [2] : 2 [FreeTextEntry1] : 6/8/2023- Patient presents for FUV after a caudal RASHEEDA on 5/12/2023.  Patient reports 97% relief following the procedure.  Patient is happy with the results of the injection. She takes 2400 mg gabapentin daily; sees neurology.\par \par 1/26/2023 - Patient presents for FUV after a caudal RASHEEDA on 1/6/2023. Patient reports greater than 80% relief from the lower back pain following the procedure. Patient is happy with the results of the injection. She takes 2400mg gabapentin daily; sees neurology.\par \par 09/29/22 - Patient presents for a FUV following a caudal RASHEEDA on 09/02/21.  Reports that her pain has been significantly reduced, reports a reduction of pain by 80%. Reports that she has increased her level of activity, and has decreased her medication usage.  \par \par 06/23/22 - Patient presents for a FUV following an left L4-5, L5-S1 TFESI on 05/06/22. She reports resolution of leg pain. She reports burning sensation to the low back with residual paraesthesia to the left toes. \par \par 04/14/22 - Patient presents for a FUV. Patient reports return of low back pain. Patient c/o low back pain radiating to the left lower extremity. Patient reports pain with walking. Patient reports relief with previous RASHEEDA. \par \par 01/27/2022 - Patient presents for a follow up evaluation and review of her lumbar spine MRI results. Patient reports that her most recent RASHEEDA 1/7/22 was helpful in alleviating her pain. Patient reports that after the steroids wear off, she experiences mild lower back pain with moderate to severe radicular left posterior thigh. Patient reports a >90% reduction in her symptoms following her last RASHEEDA. \par \par 12/02/21 - Patient presents for a follow up evaluation and review of her lumbar MRI Results. Patient c/o radicular leg pains. Patient reports that she has been experiencing increasing radicular left leg pain. Patient has weakness in her left leg. Patient c/o worsening paresthesia's in her left leg. Patient reports that walking exacerbates her pain. \par \par 09/23/21 - Patient presents for a FUV following a Caudal RASHEEDA on 09/03/21. Patient reports a 100% resolution in her leg symptoms. MRI imaging of lumbar spine was not approved prior. She reports that the duration of relief she has obtained over the last few years wit RASHEEDA's has diminished. \par \par 8/26/21 - Patient presents for a follow up evaluation and discussion of her lumbar MRI. The patient could not get approval for her MRI. The patient's main c/o is in her left leg only. \par \par 07/29/2021 - Patient presents for initial evaluation. She reports that she was seeing  for pain management following a compression fracture she developed on July, 30th 2003. The patient had a spinal fusion following her compression fracture and was found to have osteoporosis. The patient reports they did a fusion of T12-l2. The patient reports her pain is in her lower back. The patient reports her lower back radiates into her left buttock and left leg when walking. She has obtain caudal RASHEEDA's in the past for her pain. The patient reports she has been receiving RASHEEDA's every 3 months since 2005. The patient reports that she doesn't take any pain medications. The patient used to be on chronic opioids but has since discontinued use of them. Sees neurologist for chronic back pains. The patient takes gabapentin 1200mg x2 daily. \par \par Previous Injections:\par 1) Caudal RASHEEDA's - Dr. Doran (several DOS)\par 2) Caudal RASHEEDA (09/03/21, 1/7/22, 09/02/22, 1/6/2023, 5/12/2023)\par 3) Left L4-5, L5-S1 TFESI (05/06/22) \par \par Pertinent Surgical History: Yes\par 1) T12-L2 posterior spinal fusion; L1 Kyphoplasty\par \par Imaging:\par Lumbar Spine MRI (01/13/2022) - [facility]\par \par 1. Evidence For Prior T12-L2 Fusion\par 2. Evidence for a chronic moderate anterior wedging compression fracture of L1 with prior kyphoplasty augmentation\par 3. Minimal disc bulging at L4-5 with mild bilateral foraminal stenosis and mild bilateral facet arthropathy \par \par Physician Disclaimer: I have personally reviewed and confirmed all HPI data with the patient.  [] : no [FreeTextEntry7] : left leg  [de-identified] : 2003 [de-identified] : 2021 [de-identified] : mri l spine  [TWNoteComboBox1] : 100%

## 2023-06-08 NOTE — PHYSICAL EXAM
[de-identified] : Constitutional:  \par - No acute distress  \par - Well developed; well nourished  \par \par Neurological:  \par - normal mood and affect  \par - alert and oriented x 3   \par \par Cardiovascular:  \par - grossly normal \par \par Lumbar Spine Exam: \par \par Inspection: \par erythema (-) \par ecchymosis (-) \par rashes (-) \par alignment: no scoliosis \par \par Palpation: \par Midline lumbar tenderness:            (-) \par midline thoracic tenderness:          (-) \par Lumbar paraspinal tenderness:  L (-) ; R (-) \par thoracic paraspinal tenderness: L (-) ; R (-) \par sciatic nerve tenderness :          L (-) ; R (-) \par SI joint tenderness:                     L (-) ; R (-) \par GTB tenderness:                        L (-);  R (-) \par \par ROM: \par \par Strength: \par                                    Right       Left    \par Hip Flexion:                (5/5)       (5/5) \par Quadriceps:               (5/5)       (5/5) \par Hamstrings:                (5/5)       (5/5) \par Ankle Dorsiflexion:     (5/5)       (5/5) \par EHL:                           (5/5)       (5/5) \par Ankle Plantarflexion:  (5/5)       (5/5) \par \par Special Tests: \par SLR:                            R (-) ; L (-) \par Facet loading:             R (-) ; L (-) \par GISSEL test:                R (-) ; L (-) \par Hamstring tightness:   R (-);  L (-) \par \par Neurologic: \par SILT throughout right lower extremity \par SILT throughout left lower extremity \par \par Reflexes normal and symmetric bilateral lower extremities \par \par Gait: \par non- antalgic gait \par ambulates without assistive device \par \par

## 2023-09-06 ENCOUNTER — APPOINTMENT (OUTPATIENT)
Dept: PAIN MANAGEMENT | Facility: CLINIC | Age: 63
End: 2023-09-06
Payer: COMMERCIAL

## 2023-09-06 PROCEDURE — 62323 NJX INTERLAMINAR LMBR/SAC: CPT

## 2023-09-06 NOTE — PROCEDURE
[FreeTextEntry3] : Date of Service: 09/06/2023   Account: [id number]  Patient: PIPER BREWER   YOB: 1960  Age: 63 year  Surgeon:      Wilman Lopez DO  Assistant:    None  Pre-Operative Diagnosis:         Lumbar radiculopathy  Post Operative Diagnosis:       Lumbar radiculopathy  Procedure:             Caudal epidural steroid injection with fluoroscopic guidance  Anesthesia:            MAC  This procedure was carried out using fluoroscopic guidance.  The risks and benefits of the procedure were discussed extensively with the patient.  The consent of the patient was obtained and the following procedure was performed. The patient was placed in the prone position on the fluoroscopy table and the area was prepped and draped in a sterile fashion.  A timeout was performed with all essential staff present and the site and side were verified.  The sacrum was visualized using a lateral  fluoroscopic view.   Using sterile technique the superficial skin was anesthetized with 1% Lidocaine.  A 22 gauge 3.5 spinal needle was advanced under fluoroscopy until the sacrococcygeal ligament was engaged.  After confirmation of needle placement in the epidural space, the needle was advanced to approximately the S3 level.  After negative aspiration for heme and CSF, 3 cc of Omnipaque confirmed an appropriate lumbar epidurogram.  An A/P view was used to confirm dye spread.  An injectate of 8cc of preservative free normal saline with 80 mg of Kenalog was then injected into the epidural space.  Vital signs remained normal throughout the procedure.  The patient tolerated the procedure well.  There were no immediate complications from the performed procedure.  The patient was instructed to apply ice over the injection sites for twenty minutes every two hours for the next 24 hours.  Disposition:      1.  The patient was advised to F/U in 1-2 weeks to assess the response to the injection.      2.  The patient was also instructed to contact me immediately if there were any concerns related to the procedure performed.

## 2023-09-28 ENCOUNTER — APPOINTMENT (OUTPATIENT)
Dept: PAIN MANAGEMENT | Facility: CLINIC | Age: 63
End: 2023-09-28
Payer: COMMERCIAL

## 2023-09-28 VITALS — HEIGHT: 64 IN | WEIGHT: 182 LBS | BODY MASS INDEX: 31.07 KG/M2

## 2023-09-28 PROCEDURE — 99214 OFFICE O/P EST MOD 30 MIN: CPT

## 2024-01-05 ENCOUNTER — APPOINTMENT (OUTPATIENT)
Dept: PAIN MANAGEMENT | Facility: CLINIC | Age: 64
End: 2024-01-05
Payer: COMMERCIAL

## 2024-01-05 PROCEDURE — 62323 NJX INTERLAMINAR LMBR/SAC: CPT

## 2024-01-05 NOTE — PROCEDURE
[FreeTextEntry3] : Date of Service: 01/05/2024   Account: [id number]  Patient: PIPER BREWER   YOB: 1960  Age: 63 year  Surgeon:      Wilman Lopez DO  Assistant:    None  Pre-Operative Diagnosis:         Lumbar radiculopathy  Post Operative Diagnosis:       Lumbar radiculopathy  Procedure:             Caudal epidural steroid injection with fluoroscopic guidance  Anesthesia:            MAC  This procedure was carried out using fluoroscopic guidance.  The risks and benefits of the procedure were discussed extensively with the patient.  The consent of the patient was obtained and the following procedure was performed.  A timeout was performed with all essential staff present and the site and side were verified.  The sacrum was visualized using a lateral fluoroscopic view.   Using sterile technique the superficial skin was anesthetized with 1% Lidocaine.  A 22-gauge 3.5 spinal needle was advanced under fluoroscopy until the sacrococcygeal ligament was engaged.  After confirmation of needle placement in the epidural space, the needle was advanced to approximately the S3 level.  After negative aspiration for heme and CSF, 3 cc of Omnipaque confirmed an appropriate lumbar epidurogram.  An A/P view was used to confirm dye spread.  An injectate of 8cc of preservative free normal saline with 80 mg of Kenalog was then injected into the epidural space.  Vital signs remained normal throughout the procedure.  The patient tolerated the procedure well.  There were no immediate complications from the performed procedure.  The patient was instructed to apply ice over the injection sites for twenty minutes every two hours for the next 24 hours.  Disposition:      1.  The patient was advised to F/U in 1-2 weeks to assess the response to the injection.      2.  The patient was also instructed to contact me immediately if there were any concerns related to the procedure performed.

## 2024-02-01 ENCOUNTER — APPOINTMENT (OUTPATIENT)
Dept: PAIN MANAGEMENT | Facility: CLINIC | Age: 64
End: 2024-02-01
Payer: COMMERCIAL

## 2024-02-01 VITALS — WEIGHT: 182 LBS | HEIGHT: 64 IN | BODY MASS INDEX: 31.07 KG/M2

## 2024-02-01 PROCEDURE — 99214 OFFICE O/P EST MOD 30 MIN: CPT

## 2024-02-01 NOTE — ASSESSMENT
[FreeTextEntry1] : A thorough discussion occurred regarding available pain management treatment options including interventional, rehabilitative, pharmacological, and alternative modalities with the patient. We will proceed with the following:  Interventional treatment options: - can repeat caudal RASHEEDA with return of severe radicular pain; may remain on aspirin - will call - previously discussed my concern for repeat corticosteroid administration in setting for osteoporosis; patient is monitored yearly by her endocrinologist - furthermore, I counseled her I do not advise corticosteroid-based injections more than Q4-6-month intervals per year at most - see additional instructions below  Rehabilitative options: - Has completed recent PT trial with relief; encouraged on going HEP - Home exercise sheet provided at prior visit  Medication based treatment options: - Continue Tylenol 500-1000 mg TID PRN - continue gabapentin 2400 mg daily as per treating neurologist  - previous chronic opioid use several years ago; was able to self-discontinue  - see additional instructions below  Complementary treatment options: - Weight management and lifestyle modifications discussed  Additional treatment recommendations as follows: - Follow up for indicated procedure or PRN  The risks, benefits and alternatives of the proposed procedure were explained in detail with the patient.  The risks outlined include, but are not limited to, infection, bleeding, nerve injury, a temporary increase in pain, failure to resolve symptoms, allergic reaction, and possible elevation of blood sugar in diabetics.  All questions were answered to patient's apparent satisfaction and he/she verbalized an understanding.   The documentation recorded by the scribe, in my presence, accurately reflects the service I personally performed and the decisions made by me with my edits as appropriate.  I, Josué Torres acting as scribe, attest that this documentation has been prepared under the direction and in the presence of Provider Wilman Lopez DO.

## 2024-02-01 NOTE — PHYSICAL EXAM
[de-identified] : Constitutional:   - No acute distress   - Well developed; well nourished    Neurological:   - normal mood and affect   - alert and oriented x 3     Cardiovascular:   - grossly normal   Lumbar Spine Exam:   Inspection:  erythema (-)  ecchymosis (-)  rashes (-)  alignment: no scoliosis   Palpation:  Midline lumbar tenderness:            (-)  midline thoracic tenderness:          (-)  Lumbar paraspinal tenderness:  L (-) ; R (-)  thoracic paraspinal tenderness: L (-) ; R (-)  sciatic nerve tenderness :          L (-) ; R (-)  SI joint tenderness:                     L (-) ; R (-)  GTB tenderness:                        L (-);  R (-)   ROM:   Strength:                                     Right       Left     Hip Flexion:                (5/5)       (5/5)  Quadriceps:               (5/5)       (5/5)  Hamstrings:                (5/5)       (5/5)  Ankle Dorsiflexion:     (5/5)       (5/5)  EHL:                           (5/5)       (5/5)  Ankle Plantarflexion:  (5/5)       (5/5)   Special Tests:  SLR:                            R (-) ; L (-)  Facet loading:             R (-) ; L (-)  GISSEL test:                R (-) ; L (-)  Hamstring tightness:   R (-);  L (-)   Neurologic:  SILT throughout right lower extremity  SILT throughout left lower extremity   Reflexes normal and symmetric bilateral lower extremities   Gait:  non- antalgic gait  ambulates without assistive device

## 2024-02-01 NOTE — HISTORY OF PRESENT ILLNESS
[Lower back] : lower back [Buttock] : buttock [Left Leg] : left leg [9] : 9 [4] : 4 [Burning] : burning [Constant] : constant [Sleep] : sleep [Rest] : rest [Injection therapy] : injection therapy [Sitting] : sitting [Physical therapy] : physical therapy [Retired] : Work status: retired [2] : 2 [FreeTextEntry1] : 2/1/2024 - Patient presents for FUV after a caudal RASHEEDA on 1/5/2024. Patient reports a 90% relief and is pleased with the results of the injection; she performs a HEP.   9/28/2023 - Patient presents for FUV after a caudal RASHEEDA on 9/6/2023.  Patient reports 90% relief and pleased with the injection at this point.   6/8/2023- Patient presents for FUV after a caudal RASHEEDA on 5/12/2023.  Patient reports 97% relief following the procedure.  Patient is happy with the results of the injection. She takes 2400 mg gabapentin daily; sees neurology.  1/26/2023 - Patient presents for FUV after a caudal RASHEEDA on 1/6/2023. Patient reports greater than 80% relief from the lower back pain following the procedure. Patient is happy with the results of the injection. She takes 2400mg gabapentin daily; sees neurology.  09/29/22 - Patient presents for a FUV following a caudal RASHEEDA on 09/02/21.  Reports that her pain has been significantly reduced, reports a reduction of pain by 80%. Reports that she has increased her level of activity, and has decreased her medication usage.    06/23/22 - Patient presents for a FUV following an left L4-5, L5-S1 TFESI on 05/06/22. She reports resolution of leg pain. She reports burning sensation to the low back with residual paraesthesia to the left toes.   04/14/22 - Patient presents for a FUV. Patient reports return of low back pain. Patient c/o low back pain radiating to the left lower extremity. Patient reports pain with walking. Patient reports relief with previous RASHEEDA.   01/27/2022 - Patient presents for a follow up evaluation and review of her lumbar spine MRI results. Patient reports that her most recent RASHEEDA 1/7/22 was helpful in alleviating her pain. Patient reports that after the steroids wear off, she experiences mild lower back pain with moderate to severe radicular left posterior thigh. Patient reports a >90% reduction in her symptoms following her last RASHEEDA.   12/02/21 - Patient presents for a follow up evaluation and review of her lumbar MRI Results. Patient c/o radicular leg pains. Patient reports that she has been experiencing increasing radicular left leg pain. Patient has weakness in her left leg. Patient c/o worsening paresthesia's in her left leg. Patient reports that walking exacerbates her pain.   09/23/21 - Patient presents for a FUV following a Caudal RASHEEDA on 09/03/21. Patient reports a 100% resolution in her leg symptoms. MRI imaging of lumbar spine was not approved prior. She reports that the duration of relief she has obtained over the last few years wit RASHEEDA's has diminished.   8/26/21 - Patient presents for a follow up evaluation and discussion of her lumbar MRI. The patient could not get approval for her MRI. The patient's main c/o is in her left leg only.   07/29/2021 - Patient presents for initial evaluation. She reports that she was seeing  for pain management following a compression fracture she developed on July, 30th 2003. The patient had a spinal fusion following her compression fracture and was found to have osteoporosis. The patient reports they did a fusion of T12-l2. The patient reports her pain is in her lower back. The patient reports her lower back radiates into her left buttock and left leg when walking. She has obtain caudal RASHEEDA's in the past for her pain. The patient reports she has been receiving RASHEEDA's every 3 months since 2005. The patient reports that she doesn't take any pain medications. The patient used to be on chronic opioids but has since discontinued use of them. Sees neurologist for chronic back pains. The patient takes gabapentin 1200mg x2 daily.   Previous Injections: 1) Caudal RASHEEDA (1/7/22, 09/02/22, 1/6/2023, 5/12/2023, 9/6/2023, 1/5/2024) 2) Left L4-5, L5-S1 TFESI (5/06/22)   Pertinent Surgical History: Yes 1) T12-L2 posterior spinal fusion; L1 Kyphoplasty  Imaging: Lumbar Spine MRI (01/13/2022) - R  1. Evidence For Prior T12-L2 Fusion 2. Evidence for a chronic moderate anterior wedging compression fracture of L1 with prior kyphoplasty augmentation 3. Minimal disc bulging at L4-5 with mild bilateral foraminal stenosis and mild bilateral facet arthropathy   Physician Disclaimer: I have personally reviewed and confirmed all HPI data with the patient.  [] : no [FreeTextEntry7] : left leg  [de-identified] : 2003 [de-identified] : 2021 [de-identified] : mri l spine  [TWNoteComboBox1] : 100%

## 2024-05-03 ENCOUNTER — APPOINTMENT (OUTPATIENT)
Dept: PAIN MANAGEMENT | Facility: CLINIC | Age: 64
End: 2024-05-03
Payer: MEDICARE

## 2024-05-03 PROCEDURE — 62323 NJX INTERLAMINAR LMBR/SAC: CPT

## 2024-05-03 NOTE — PROCEDURE
[FreeTextEntry3] : Date of Service: 05/03/2024   Account: [id number]  Patient: PIPER BREWER   YOB: 1960  Age: 64 year  Surgeon:      Wilman Lopez DO  Assistant:    None  Pre-Operative Diagnosis:         Lumbar radiculopathy  Post Operative Diagnosis:       Lumbar radiculopathy  Procedure:             Caudal epidural steroid injection with fluoroscopic guidance  Anesthesia:            MAC  This procedure was carried out using fluoroscopic guidance.  The risks and benefits of the procedure were discussed extensively with the patient.  The consent of the patient was obtained and the following procedure was performed. The patient was placed in the prone position on the fluoroscopy table and the area was prepped and draped in a sterile fashion.  A timeout was performed with all essential staff present and the site and side were verified.  The sacrum was visualized using a lateral fluoroscopic view.   Using sterile technique, the superficial skin was anesthetized with 1% Lidocaine.  A 22-gauge 3.5 spinal needle was advanced under fluoroscopy until the sacrococcygeal ligament was engaged.  After confirmation of needle placement in the epidural space, the needle was advanced to approximately the S3 level.  After negative aspiration for heme and CSF, 3 cc of Omnipaque confirmed an appropriate lumbar epidurogram.  An A/P view was used to confirm dye spread.  An injectate of 8cc of preservative free normal saline with 80 mg of Kenalog was then injected into the epidural space.  Vital signs remained normal throughout the procedure.  The patient tolerated the procedure well.  There were no immediate complications from the performed procedure.  The patient was instructed to apply ice over the injection sites for twenty minutes every two hours for the next 24 hours.  Disposition:      1.  The patient was advised to F/U in 1-2 weeks to assess the response to the injection.      2.  The patient was also instructed to contact me immediately if there were any concerns related to the procedure performed.

## 2024-05-23 ENCOUNTER — APPOINTMENT (OUTPATIENT)
Dept: PAIN MANAGEMENT | Facility: CLINIC | Age: 64
End: 2024-05-23
Payer: MEDICARE

## 2024-05-23 VITALS — HEIGHT: 64 IN | WEIGHT: 188 LBS | BODY MASS INDEX: 32.1 KG/M2

## 2024-05-23 DIAGNOSIS — M51.26 OTHER INTERVERTEBRAL DISC DISPLACEMENT, LUMBAR REGION: ICD-10-CM

## 2024-05-23 DIAGNOSIS — M54.16 RADICULOPATHY, LUMBAR REGION: ICD-10-CM

## 2024-05-23 DIAGNOSIS — Z98.1 ARTHRODESIS STATUS: ICD-10-CM

## 2024-05-23 PROCEDURE — 99214 OFFICE O/P EST MOD 30 MIN: CPT

## 2024-05-23 NOTE — ASSESSMENT
[FreeTextEntry1] : A thorough discussion occurred regarding available pain management treatment options including interventional, rehabilitative, pharmacological, and alternative modalities with the patient. We will proceed with the following:  Interventional treatment options: - can repeat caudal RASHEEDA with return of severe radicular pain; may remain on aspirin - will call - previously discussed my concern for repeat corticosteroid administration in setting for osteoporosis; patient is monitored yearly by her endocrinologist - furthermore, I counseled her I do not advise corticosteroid-based injections more than Q4-6-month intervals per year at most - see additional instructions below  Rehabilitative options: - Has completed recent PT trial with relief; encouraged on going HEP - Home exercise sheet provided at prior visit  Medication based treatment options: - Continue Tylenol 500-1000 mg up to TID PRN - Continue OTC NSAIDs on as-needed basis - continue gabapentin 2400 mg daily as per treating neurologist  - previous chronic opioid use several years ago; was able to self-discontinue  - see additional instructions below  Complementary treatment options: - Weight management and lifestyle modifications discussed  Additional treatment recommendations as follows: - Follow up for indicated procedure or PRN  The risks, benefits and alternatives of the proposed procedure were explained in detail with the patient.  The risks outlined include, but are not limited to, infection, bleeding, nerve injury, a temporary increase in pain, failure to resolve symptoms, allergic reaction, and possible elevation of blood sugar in diabetics.  All questions were answered to patient's apparent satisfaction and he/she verbalized an understanding.   The documentation recorded by the scribe, in my presence, accurately reflects the service I personally performed and the decisions made by me with my edits as appropriate.  I, Josué Torres acting as scribe, attest that this documentation has been prepared under the direction and in the presence of Provider Wilman Lopez DO.

## 2024-05-23 NOTE — HISTORY OF PRESENT ILLNESS
[Lower back] : lower back [Buttock] : buttock [Left Leg] : left leg [9] : 9 [4] : 4 [Burning] : burning [Constant] : constant [Sleep] : sleep [Rest] : rest [Injection therapy] : injection therapy [Sitting] : sitting [Physical therapy] : physical therapy [Retired] : Work status: retired [2] : 2 [FreeTextEntry1] : 5/23/2024 - Patient presents for FUV after a caudal RASHEEDA on 5/3/2024. Patient reports 90% relief and pleased with the injection; she has reduced medication usage, using Motrin and Tylenol sparingly.   2/1/2024 - Patient presents for FUV after a caudal RASHEEDA on 1/5/2024. Patient reports a 90% relief and is pleased with the results of the injection; she performs a HEP.   9/28/2023 - Patient presents for FUV after a caudal RASHEEDA on 9/6/2023.  Patient reports 90% relief and pleased with the injection at this point.   6/8/2023- Patient presents for FUV after a caudal RASHEEDA on 5/12/2023.  Patient reports 97% relief following the procedure.  Patient is happy with the results of the injection. She takes 2400 mg gabapentin daily; sees neurology.  1/26/2023 - Patient presents for FUV after a caudal RASHEEDA on 1/6/2023. Patient reports greater than 80% relief from the lower back pain following the procedure. Patient is happy with the results of the injection. She takes 2400mg gabapentin daily; sees neurology.  09/29/22 - Patient presents for a FUV following a caudal RASHEEDA on 09/02/21.  Reports that her pain has been significantly reduced, reports a reduction of pain by 80%. Reports that she has increased her level of activity, and has decreased her medication usage.    06/23/22 - Patient presents for a FUV following an left L4-5, L5-S1 TFESI on 05/06/22. She reports resolution of leg pain. She reports burning sensation to the low back with residual paraesthesia to the left toes.   04/14/22 - Patient presents for a FUV. Patient reports return of low back pain. Patient c/o low back pain radiating to the left lower extremity. Patient reports pain with walking. Patient reports relief with previous RASHEEDA.   01/27/2022 - Patient presents for a follow up evaluation and review of her lumbar spine MRI results. Patient reports that her most recent RASHEEDA 1/7/22 was helpful in alleviating her pain. Patient reports that after the steroids wear off, she experiences mild lower back pain with moderate to severe radicular left posterior thigh. Patient reports a >90% reduction in her symptoms following her last RASHEEDA.   12/02/21 - Patient presents for a follow up evaluation and review of her lumbar MRI Results. Patient c/o radicular leg pains. Patient reports that she has been experiencing increasing radicular left leg pain. Patient has weakness in her left leg. Patient c/o worsening paresthesia's in her left leg. Patient reports that walking exacerbates her pain.   09/23/21 - Patient presents for a FUV following a Caudal RASHEEDA on 09/03/21. Patient reports a 100% resolution in her leg symptoms. MRI imaging of lumbar spine was not approved prior. She reports that the duration of relief she has obtained over the last few years wit RASHEEDA's has diminished.   8/26/21 - Patient presents for a follow up evaluation and discussion of her lumbar MRI. The patient could not get approval for her MRI. The patient's main c/o is in her left leg only.   07/29/2021 - Patient presents for initial evaluation. She reports that she was seeing  for pain management following a compression fracture she developed on July, 30th 2003. The patient had a spinal fusion following her compression fracture and was found to have osteoporosis. The patient reports they did a fusion of T12-l2. The patient reports her pain is in her lower back. The patient reports her lower back radiates into her left buttock and left leg when walking. She has obtain caudal RASHEEDA's in the past for her pain. The patient reports she has been receiving RASHEEDA's every 3 months since 2005. The patient reports that she doesn't take any pain medications. The patient used to be on chronic opioids but has since discontinued use of them. Sees neurologist for chronic back pains. The patient takes gabapentin 1200mg x2 daily.   Previous Injections: 1) Caudal RASHEEDA (1/7/22, 09/02/22, 1/6/2023, 5/12/2023, 9/6/2023, 1/5/2024, 5/3/2024) 2) Left L4-5, L5-S1 TFESI (5/06/22)   Pertinent Surgical History: Yes 1) T12-L2 posterior spinal fusion; L1 Kyphoplasty  Imaging: Lumbar Spine MRI (01/13/2022) - [facility]  1. Evidence For Prior T12-L2 Fusion 2. Evidence for a chronic moderate anterior wedging compression fracture of L1 with prior kyphoplasty augmentation 3. Minimal disc bulging at L4-5 with mild bilateral foraminal stenosis and mild bilateral facet arthropathy   Physician Disclaimer: I have personally reviewed and confirmed all HPI data with the patient.  [] : no [FreeTextEntry7] : left leg  [de-identified] : 2003 [de-identified] : 2021 [de-identified] : mri l spine  [TWNoteComboBox1] : 100%

## 2024-09-06 ENCOUNTER — APPOINTMENT (OUTPATIENT)
Dept: PAIN MANAGEMENT | Facility: CLINIC | Age: 64
End: 2024-09-06

## 2024-09-27 ENCOUNTER — APPOINTMENT (OUTPATIENT)
Dept: PAIN MANAGEMENT | Facility: CLINIC | Age: 64
End: 2024-09-27
Payer: MEDICARE

## 2024-09-27 DIAGNOSIS — M54.16 RADICULOPATHY, LUMBAR REGION: ICD-10-CM

## 2024-09-27 PROCEDURE — 62323 NJX INTERLAMINAR LMBR/SAC: CPT

## 2024-09-27 NOTE — PROCEDURE
[FreeTextEntry3] : Date of Service: 09/27/2024   Account: [id number]  Patient: PIPER BREWER   YOB: 1960  Age: 64 year  Surgeon:      Wilman Lopez DO  Assistant:    None  Pre-Operative Diagnosis:         Lumbar radiculopathy  Post Operative Diagnosis:       Lumbar radiculopathy  Procedure:             Caudal epidural steroid injection with fluoroscopic guidance  Anesthesia:            MAC  This procedure was carried out using fluoroscopic guidance.  The risks and benefits of the procedure were discussed extensively with the patient.  The consent of the patient was obtained and the following procedure was performed.  A timeout was performed with all essential staff present and the site and side were verified.  The sacrum was visualized using a lateral fluoroscopic view.   Using sterile technique the superficial skin was anesthetized with 1% Lidocaine.  A 22-gauge 3.5 spinal needle was advanced under fluoroscopy until the sacrococcygeal ligament was engaged.  After confirmation of needle placement in the epidural space, the needle was advanced to approximately the S3 level.  After negative aspiration for heme and CSF, 3 cc of Omnipaque confirmed an appropriate lumbar epidurogram.  An A/P view was used to confirm dye spread.  An injectate of 8cc of preservative free normal saline with 80 mg of Kenalog was then injected into the epidural space.  Vital signs remained normal throughout the procedure.  The patient tolerated the procedure well.  There were no immediate complications from the performed procedure.  The patient was instructed to apply ice over the injection sites for twenty minutes every two hours for the next 24 hours.  Disposition:      1.  The patient was advised to F/U in 1-2 weeks to assess the response to the injection.      2.  The patient was also instructed to contact me immediately if there were any concerns related to the procedure performed.

## 2024-10-24 ENCOUNTER — APPOINTMENT (OUTPATIENT)
Dept: PAIN MANAGEMENT | Facility: CLINIC | Age: 64
End: 2024-10-24
Payer: MEDICARE

## 2024-10-24 VITALS — HEIGHT: 64 IN | WEIGHT: 147 LBS | BODY MASS INDEX: 25.1 KG/M2

## 2024-10-24 DIAGNOSIS — M51.26 OTHER INTERVERTEBRAL DISC DISPLACEMENT, LUMBAR REGION: ICD-10-CM

## 2024-10-24 DIAGNOSIS — Z98.1 ARTHRODESIS STATUS: ICD-10-CM

## 2024-10-24 DIAGNOSIS — M54.16 RADICULOPATHY, LUMBAR REGION: ICD-10-CM

## 2024-10-24 PROCEDURE — 99214 OFFICE O/P EST MOD 30 MIN: CPT

## 2025-01-31 ENCOUNTER — APPOINTMENT (OUTPATIENT)
Dept: PAIN MANAGEMENT | Facility: CLINIC | Age: 65
End: 2025-01-31
Payer: MEDICARE

## 2025-01-31 DIAGNOSIS — M54.16 RADICULOPATHY, LUMBAR REGION: ICD-10-CM

## 2025-01-31 PROCEDURE — 62323 NJX INTERLAMINAR LMBR/SAC: CPT

## 2025-02-12 ENCOUNTER — NON-APPOINTMENT (OUTPATIENT)
Age: 65
End: 2025-02-12

## 2025-02-20 ENCOUNTER — APPOINTMENT (OUTPATIENT)
Dept: DERMATOLOGY | Facility: CLINIC | Age: 65
End: 2025-02-20
Payer: MEDICARE

## 2025-02-20 PROCEDURE — 17110 DESTRUCTION B9 LES UP TO 14: CPT

## 2025-02-20 PROCEDURE — 99203 OFFICE O/P NEW LOW 30 MIN: CPT | Mod: 25

## 2025-02-27 ENCOUNTER — APPOINTMENT (OUTPATIENT)
Dept: PAIN MANAGEMENT | Facility: CLINIC | Age: 65
End: 2025-02-27
Payer: MEDICARE

## 2025-02-27 VITALS — WEIGHT: 146 LBS | BODY MASS INDEX: 24.92 KG/M2 | HEIGHT: 64 IN

## 2025-02-27 DIAGNOSIS — Z78.9 OTHER SPECIFIED HEALTH STATUS: ICD-10-CM

## 2025-02-27 DIAGNOSIS — M51.26 OTHER INTERVERTEBRAL DISC DISPLACEMENT, LUMBAR REGION: ICD-10-CM

## 2025-02-27 DIAGNOSIS — M54.16 RADICULOPATHY, LUMBAR REGION: ICD-10-CM

## 2025-02-27 DIAGNOSIS — Z98.1 ARTHRODESIS STATUS: ICD-10-CM

## 2025-02-27 PROCEDURE — 99214 OFFICE O/P EST MOD 30 MIN: CPT

## 2025-02-27 RX ORDER — DENOSUMAB 60 MG/ML
60 INJECTION SUBCUTANEOUS
Refills: 0 | Status: ACTIVE | COMMUNITY

## 2025-02-27 RX ORDER — ESCITALOPRAM OXALATE 5 MG/1
TABLET, FILM COATED ORAL
Refills: 0 | Status: ACTIVE | COMMUNITY

## 2025-02-27 RX ORDER — ATORVASTATIN CALCIUM 80 MG/1
TABLET, FILM COATED ORAL
Refills: 0 | Status: ACTIVE | COMMUNITY

## 2025-02-27 RX ORDER — ASPIRIN 81 MG
TABLET,CHEWABLE ORAL
Refills: 0 | Status: ACTIVE | COMMUNITY

## 2025-02-27 RX ORDER — OMEPRAZOLE 40 MG/1
40 CAPSULE, DELAYED RELEASE ORAL
Refills: 0 | Status: ACTIVE | COMMUNITY

## 2025-05-16 ENCOUNTER — APPOINTMENT (OUTPATIENT)
Dept: PAIN MANAGEMENT | Facility: CLINIC | Age: 65
End: 2025-05-16
Payer: MEDICARE

## 2025-05-16 DIAGNOSIS — M54.16 RADICULOPATHY, LUMBAR REGION: ICD-10-CM

## 2025-05-16 PROCEDURE — 62323 NJX INTERLAMINAR LMBR/SAC: CPT

## 2025-06-05 ENCOUNTER — APPOINTMENT (OUTPATIENT)
Dept: PAIN MANAGEMENT | Facility: CLINIC | Age: 65
End: 2025-06-05
Payer: MEDICARE

## 2025-06-05 VITALS — HEIGHT: 64 IN | BODY MASS INDEX: 24.59 KG/M2 | WEIGHT: 144 LBS

## 2025-06-05 DIAGNOSIS — M51.26 OTHER INTERVERTEBRAL DISC DISPLACEMENT, LUMBAR REGION: ICD-10-CM

## 2025-06-05 DIAGNOSIS — M54.16 RADICULOPATHY, LUMBAR REGION: ICD-10-CM

## 2025-06-05 DIAGNOSIS — Z98.1 ARTHRODESIS STATUS: ICD-10-CM

## 2025-06-05 PROCEDURE — 99214 OFFICE O/P EST MOD 30 MIN: CPT

## 2025-09-05 ENCOUNTER — APPOINTMENT (OUTPATIENT)
Dept: PAIN MANAGEMENT | Facility: CLINIC | Age: 65
End: 2025-09-05
Payer: MEDICARE

## 2025-09-05 DIAGNOSIS — M54.16 RADICULOPATHY, LUMBAR REGION: ICD-10-CM

## 2025-09-05 PROCEDURE — 62323 NJX INTERLAMINAR LMBR/SAC: CPT
